# Patient Record
Sex: FEMALE | URBAN - METROPOLITAN AREA
[De-identification: names, ages, dates, MRNs, and addresses within clinical notes are randomized per-mention and may not be internally consistent; named-entity substitution may affect disease eponyms.]

---

## 2021-06-28 ENCOUNTER — PRE-OPERATIVE VISIT (OUTPATIENT)
Dept: URBAN - METROPOLITAN AREA CLINIC 64 | Facility: CLINIC | Age: 81
End: 2021-06-28
Payer: MEDICARE

## 2021-06-28 DIAGNOSIS — H52.221 REGULAR ASTIGMATISM, RIGHT EYE: ICD-10-CM

## 2021-06-28 DIAGNOSIS — H11.011 AMYLOID PTERYGIUM OF RIGHT EYE: ICD-10-CM

## 2021-06-28 DIAGNOSIS — H35.3131 BILATERAL NONEXUDATIVE AGE-RELATED MACULAR DEGENERATION, EARLY DRY STAGE: ICD-10-CM

## 2021-06-28 DIAGNOSIS — H25.811 COMBINED FORMS OF AGE-RELATED CATARACT, RIGHT EYE: ICD-10-CM

## 2021-06-28 DIAGNOSIS — H52.222 REGULAR ASTIGMATISM, LEFT EYE: ICD-10-CM

## 2021-06-28 DIAGNOSIS — H25.813 COMBINED FORMS OF AGE-RELATED CATARACT, BILATERAL: Primary | ICD-10-CM

## 2021-06-28 DIAGNOSIS — H25.812 COMBINED FORMS OF AGE-RELATED CATARACT, LEFT EYE: ICD-10-CM

## 2021-06-28 PROCEDURE — 99204 OFFICE O/P NEW MOD 45 MIN: CPT | Performed by: OPHTHALMOLOGY

## 2021-06-28 PROCEDURE — 92136 OPHTHALMIC BIOMETRY: CPT | Performed by: OPHTHALMOLOGY

## 2021-06-28 ASSESSMENT — KERATOMETRY
OS: 41.15
OD: 41.76

## 2021-06-28 ASSESSMENT — INTRAOCULAR PRESSURE
OD: 11
OS: 11

## 2021-06-28 ASSESSMENT — VISUAL ACUITY
OD: 20/60
OS: 20/30

## 2021-06-28 NOTE — IMPRESSION/PLAN
Impression: Combined forms of age-related cataract, right eye: H25.811. Plan: Discussed cataracts, treatment options, and surgical risks/benefits with patient. Patient elects surgical treatment. Recommend surgery OU, OD first. Aim OD: [place]. Aim OS: -0.25. Recommend ORA. Recommend monofocal IOL due to medical problems, early dry AMD.
If pt elects to have pterygium excision OD, plan on pterygium Sx OD, then Cat Sx OS, then Cat Sx OD.

## 2021-06-28 NOTE — IMPRESSION/PLAN
Impression: Bilateral nonexudative age-related macular degeneration, early dry stage: H35.3131. Plan: [place] and geographic RPE atrophy. Monitor with [facility] provider.

## 2021-06-28 NOTE — IMPRESSION/PLAN
Impression: Combined forms of age-related cataract, left eye: H25.812. Plan: Discussed cataracts, treatment options, and surgical risks/benefits with patient. Patient elects surgical treatment. Recommend surgery OU, OD first. Aim OD: [place]. Aim OS: -0.25. Recommend ORA. Recommend monofocal IOL due to medical problems, early dry AMD.
If pt elects to have pterygium excision OD, plan on pterygium Sx OD, then Cat Sx OS, then Cat Sx OD.

## 2021-06-28 NOTE — IMPRESSION/PLAN
Impression: Amyloid pterygium of right eye: H11.011. Plan: Temporal. Discussed. Discussed diagnosis and treatment options with patient. Reviewed risks/benefits/alternatives to surgical removal. Patient wishes to proceed with surgery. Schedule pterygium removal OD.